# Patient Record
(demographics unavailable — no encounter records)

---

## 2025-03-14 NOTE — DISCUSSION/SUMMARY
[de-identified] : This patient presents today complaining of right hip and groin pain with mechanical symptoms of clicking popping.  I discussed the diagnosis and treatment recommendations.  At this point she does have a history of dysplasia as a child.  I recommend an MRI to rule out labral tearing.  Will see her back after the MRI for follow-up and review of the results.  In addition we placed on a course of meloxicam 15 mg each day for the next 10 to 14 days.  She should also avoid high impact activities and excessive running or walking.  I recommended a course of Mobic, 15mg per day for the next 2 weeks.  The patient was given a prescription for the Mobic with directions to take it once daily with the first meal of the day.  They were instructed to stop the medicine and call the office if there are any adverse reactions to the medicine.  At least 30 minutes was spent performing the evaluation and management on today's office visit.  This includes but is not limited to preparing to see patient including review of any test results or outside medical records, obtaining and/or reviewing separately obtained history, performing examination and evaluation, counseling and educating the patient on their diagnosis and treatment recommendations, ordering medications, tests, or procedures, documenting clinical information in the electronic health record, independently interpreting results (not separately reported) and communicating results to the patient, and coordination of care.

## 2025-03-14 NOTE — PHYSICAL EXAM
[de-identified] : The patient appears well nourished  and in no apparent distress.  The patient is alert and oriented to person, place, and time.   Affect and mood appear normal.    The head is normocephalic and atraumatic.  The eyes reveal normal sclera and extra ocular muscles are intact.   The neck appears normal with no jugular venous distention or masses noted.   Skin shows normal turgor with no evidence of eczema or psoriasis.  No respiratory distress noted.  The patient ambulates with a normal gait.  Exam of the right hip reveals full range of motion.  There is some discomfort with terminal external rotation of the hip.  There are some tenderness to palpation about the anterior hip.  There is no soft tissue swelling.  There are no masses palpable.  There is no ecchymosis.  There is no warmth erythema.  She has good strength about the lower extremity.  Pulses are intact distally.  Capillary refill is normal. There is no edema or lymphadenopathy. [de-identified] : X-ray of the pelvis and a frog lateral view of the right hip were obtained.  There may be some slight uncovering of the femoral heads bilaterally.  The joint spaces of the hips are well-maintained.  No evidence of any significant degenerative arthritis is noted.  No evidence of any fracture noted.  No subluxation or dislocation about the hips noted.  SI joints appear intact.

## 2025-03-14 NOTE — HISTORY OF PRESENT ILLNESS
[de-identified] : This patient presents today with complaints of right groin pain and snapping since 2 days ago after a trip to [place].  She denies any specific injury.  She notes pain in the groin with clicking and cracking sensations with ambulation.  Pain worse with ambulation and going up and down stairs.  Pain is improved with rest and Advil.  She has some pain that radiates into the thigh.  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

## 2025-03-28 NOTE — DISCUSSION/SUMMARY
[de-identified] : This patient presents today for follow-up regarding right hip pain.  The MRI reveals evidence of a labral tear with some impingement.  I discussed the MRI findings as well as treatment options.  At this point she did not take the meloxicam for more than 2 days.  I recommend she restart the meloxicam and try to take it for 5 to 7 days.  I also recommend evaluation Dr. Garcia regarding further treatment for her hip labral tear and impingement.  At least 20 minutes was spent performing the evaluation and management on today's office visit.  This includes but is not limited to preparing to see patient including review of any test results or outside medical records, obtaining and/or reviewing separately obtained history, performing examination and evaluation, counseling and educating the patient on their diagnosis and treatment recommendations, ordering medications, tests, or procedures, documenting clinical information in the electronic health record, independently interpreting results (not separately reported) and communicating results to the patient, and coordination of care.

## 2025-03-28 NOTE — PHYSICAL EXAM
[de-identified] : The patient appears well nourished  and in no apparent distress.  The patient is alert and oriented to person, place, and time.   Affect and mood appear normal.    The head is normocephalic and atraumatic.  The eyes reveal normal sclera and extra ocular muscles are intact.   The neck appears normal with no jugular venous distention or masses noted.   Skin shows normal turgor with no evidence of eczema or psoriasis.  No respiratory distress noted.  The patient ambulates with a normal gait.  Exam of the right hip reveals full range of motion.  There is some discomfort with terminal external rotation of the hip.  There are some tenderness to palpation about the anterior hip.  There is no soft tissue swelling.  There are no masses palpable.  There is no ecchymosis.  There is no warmth erythema.  She has good strength about the lower extremity.  Pulses are intact distally.  Capillary refill is normal. There is no edema or lymphadenopathy. [de-identified] : MRI was reviewed.  This showed evidence of a labral tear.  There is also evidence of some cam type impingement.

## 2025-04-02 NOTE — DISCUSSION/SUMMARY
[de-identified] : 35yF pw R hip JANET.  The patient was extensively counseled on treatment options including but not limited to observation, rest/activity modification, bracing, anti-inflammatory medications, physical therapy, injections, and surgery.  The natural history of the disease was thoroughly explained.  We discussed that the majority of the time, this condition can be initially treated conservatively. The patient will proceed with: -referral to Dr. Shelley for possible US guided R hip inj -PT -pt was instructed on the importance of resting, icing and elevating to minimize swelling -RTC 6w after PT   I have personally obtained the history, reviewed the ROS as noted, and performed the physical examination today.  The patient and I discussed the assessment and options and developed the plan.  All questions were answered and the patient stated their understanding of the treatment plan and appreciation of the visit.   My cumulative time spent on this patient's visit included: Preparation for the visit, review of the medical records, review of pertinent diagnostic studies, examination and counseling of the patient on the above diagnosis, treatment plan and prognosis, orders of diagnostic tests, medications and/or appropriate procedures and documentation in the medical records of today's visit.   Fede Garcia MD

## 2025-04-02 NOTE — HISTORY OF PRESENT ILLNESS
[de-identified] : 35yF referred by Dr. Adrian JEAN hip pain.   presents with increasing hip and groin pain.  Pt notes extending sitting and ascending/descending stairs worsens the hip pain and symptoms.  Does not recall an inciting traumatic event but recent flight to [place] worsened.  Pt has tried activity modification and NSAIDs with minimal relief, pain level remains a 6/10.  Feels activity level has changed and difficulty participating in sports.  Some tightness noted in hip.  Has not trialed physical therapy or injections.  Pt denies numbness, paresthesias, f/c.  (+) clicking inside hip Pt notes the pain interferes with activities of daily life and that overall mobility has been decreased.  They wish to discuss possible treatment options and return to baseline activity and mobility before symptomatic onset.  Played volleyball in college, used to run half marathons. Works in finance.

## 2025-04-02 NOTE — PHYSICAL EXAM
[de-identified] : Gen: NAD Resp: Nonlabored respirations, no SOB Gait: nl  Side: Skin in tact Tenderness:   Hip ROM                               Flexion              Extension               IR               ER Affected               120                    10                         10              30 Normal                  120                    10                         25              50   Strength                              Flexion              Extension           Abduction        Adduction  Affected               5                        5                               5                     5                     Normal                  5                        5                               5                     5  Provocative Tests: Log roll  (-) FADIR   (+) CHRISTOPHE   (-) Yovanny   (-) Resisted SLR  (-)   [de-identified] : I independently reviewed and interpreted the MRI of the R hip.  I discussed with the patient the MRI demonstrates JAENT + labral tear, cam lesion

## 2025-04-07 NOTE — PHYSICAL EXAM
[de-identified] : Constitutional: Well-nourished, well-developed, No acute distress Respiratory:  Good respiratory effort, no SOB Psychiatric: Pleasant and normal affect, alert and oriented x3 Skin: Clean dry and intact B/L LE Musculoskeletal: normal except where as noted in regional exam    Right Hip:   APPEARANCE: no marked deformities, no swelling or malalignment POSITIVE TENDERNESS: Hip flexor region, sartorius, proximal rectus femoris NONTENDER: greater trochanter, TFL, gluteal region, ischium/proximal hamstring region, and pubic symphysis. ROM: Limited in all planes due to pain. RESISTIVE TESTING: MMT 4+/5 and mild pain with hip flexion, painless resisted ER/IR/SLR/abduction/adduction. SPECIAL TESTS: + FADIR, neg CHRISTOPHE, mild pain with loaded flexion & scouring. neg fulcrum test  [de-identified] : MRI right hip at [facility] on 3/24/25 Impression: Mild CAM femoral acetabular impingement and a focal linear tear of the anterosuperior labral base. There is a small right femoral acetabular joint effusion. There is no cartilage loss. Degenerative changes at the lumbosacral junction, not fully evaluated.

## 2025-04-07 NOTE — DISCUSSION/SUMMARY
[de-identified] : Discussed findings of today's exam and possible causes of patient's pain.  Educated patient on their most probable diagnosis of chronic intermittent right hip pain with recent atraumatic exacerbation due to underlying labral tear and JANET.  Reviewed possible courses of treatment, and we collaboratively decided best course of treatment at this time will include diagnostic/therapeutic ultrasound guided steroid injection today (see procedure note).  Patient advised to make note of whether their pain is improved starting in the next few minutes until 4-6 hours while the lidocaine numbs the interior of the hip joint; this is the diagnostic part of the injection. If pain is relieved immediately that helps us determine that the etiology of the pain is coming from within the hip joint. The steroid injected into the hip today will start to have an effect in the coming days, will be most effective in the next 1-2 weeks, and may last 1-2 months; that is the therapeutic portion of this injection.  The patient should follow up with Dr. Garcia in 2 months or prn for further management.  Patient appreciates and agrees with current plan.    This note was generated using dragon medical dictation software. A reasonable effort has been made for proofreading its contents, but typos may still remain. If there are any questions or points of clarification needed please notify my office.

## 2025-04-07 NOTE — PROCEDURE
[de-identified] : Ultrasound-Guided Diagnostic/Therapeutic Injection: Right Hip Intra-articular Injection.  Indication for U/S Guidance: Ensure placement within the femoroacetabular joint for diagnostic purposes, while avoiding anterior neurovascular structures  Indication for Injection: JANET.  A discussion was had with the patient regarding this procedure and all questions were answered. All risks, benefits and alternatives were discussed. These included but were not limited to bleeding, infection, and allergic reaction.  A timeout was done to ensure correct side and patient agreed to the procedure.  A [place] griffin was created on the skin utilizing a plastic needle cap to griffin the anticipated point of entry. Alcohol was used to clean the skin, and Betadine was used to sterilize and prep the area in the anterior aspect of the hip joint. The femoroacetabular joint was visualized utilizing the SonAnulexte II Edge, the Curvilinear 30cm 5-2 MHz transducer, sterile probe cover and sterile ultrasound gel.  The joint was visualized in the longitudinal axis and an in-plane approach was used for the injection.  Ultrasound guidance was utilized to ensure accuracy of the intra-articular injection, and avoid the femoral neurovascular structures. Ethyl chloride spray was then used as a topical anesthetic.  A 22-gauge 3" needle was used to inject 2cc 0.25% bupivacaine and 1cc of 40mg/ml methylprednisolone into the femoroacetabular joint. An image confirming the correct location of the needle placement and infusion of the steroid at the end of the injection was saved.  A sterile bandage was then applied. The patient tolerated the procedure well and there were no complications.

## 2025-04-07 NOTE — PHYSICAL EXAM
[de-identified] : Constitutional: Well-nourished, well-developed, No acute distress Respiratory:  Good respiratory effort, no SOB Psychiatric: Pleasant and normal affect, alert and oriented x3 Skin: Clean dry and intact B/L LE Musculoskeletal: normal except where as noted in regional exam    Right Hip:   APPEARANCE: no marked deformities, no swelling or malalignment POSITIVE TENDERNESS: Hip flexor region, sartorius, proximal rectus femoris NONTENDER: greater trochanter, TFL, gluteal region, ischium/proximal hamstring region, and pubic symphysis. ROM: Limited in all planes due to pain. RESISTIVE TESTING: MMT 4+/5 and mild pain with hip flexion, painless resisted ER/IR/SLR/abduction/adduction. SPECIAL TESTS: + FADIR, neg CHRISTOPHE, mild pain with loaded flexion & scouring. neg fulcrum test  [de-identified] : MRI right hip at [facility] on 3/24/25 Impression: Mild CAM femoral acetabular impingement and a focal linear tear of the anterosuperior labral base. There is a small right femoral acetabular joint effusion. There is no cartilage loss. Degenerative changes at the lumbosacral junction, not fully evaluated.

## 2025-04-07 NOTE — DISCUSSION/SUMMARY
[de-identified] : Discussed findings of today's exam and possible causes of patient's pain.  Educated patient on their most probable diagnosis of chronic intermittent right hip pain with recent atraumatic exacerbation due to underlying labral tear and JANET.  Reviewed possible courses of treatment, and we collaboratively decided best course of treatment at this time will include diagnostic/therapeutic ultrasound guided steroid injection today (see procedure note).  Patient advised to make note of whether their pain is improved starting in the next few minutes until 4-6 hours while the lidocaine numbs the interior of the hip joint; this is the diagnostic part of the injection. If pain is relieved immediately that helps us determine that the etiology of the pain is coming from within the hip joint. The steroid injected into the hip today will start to have an effect in the coming days, will be most effective in the next 1-2 weeks, and may last 1-2 months; that is the therapeutic portion of this injection.  The patient should follow up with Dr. Garcia in 2 months or prn for further management.  Patient appreciates and agrees with current plan.    This note was generated using dragon medical dictation software. A reasonable effort has been made for proofreading its contents, but typos may still remain. If there are any questions or points of clarification needed please notify my office.

## 2025-04-07 NOTE — PROCEDURE
[de-identified] : Ultrasound-Guided Diagnostic/Therapeutic Injection: Right Hip Intra-articular Injection.  Indication for U/S Guidance: Ensure placement within the femoroacetabular joint for diagnostic purposes, while avoiding anterior neurovascular structures  Indication for Injection: JANET.  A discussion was had with the patient regarding this procedure and all questions were answered. All risks, benefits and alternatives were discussed. These included but were not limited to bleeding, infection, and allergic reaction.  A timeout was done to ensure correct side and patient agreed to the procedure.  A [place] griffin was created on the skin utilizing a plastic needle cap to griffin the anticipated point of entry. Alcohol was used to clean the skin, and Betadine was used to sterilize and prep the area in the anterior aspect of the hip joint. The femoroacetabular joint was visualized utilizing the SonPoshlyte II Edge, the Curvilinear 30cm 5-2 MHz transducer, sterile probe cover and sterile ultrasound gel.  The joint was visualized in the longitudinal axis and an in-plane approach was used for the injection.  Ultrasound guidance was utilized to ensure accuracy of the intra-articular injection, and avoid the femoral neurovascular structures. Ethyl chloride spray was then used as a topical anesthetic.  A 22-gauge 3" needle was used to inject 2cc 0.25% bupivacaine and 1cc of 40mg/ml methylprednisolone into the femoroacetabular joint. An image confirming the correct location of the needle placement and infusion of the steroid at the end of the injection was saved.  A sterile bandage was then applied. The patient tolerated the procedure well and there were no complications.   Improved.

## 2025-04-07 NOTE — HISTORY OF PRESENT ILLNESS
[de-identified] : Ms. JORGE PRYOR is a 35 year old woman presents today for evaluation of right hip pain referred by Dr. Garcia. She had gone to [place] about a month ago and after the long flight she noted right groin pain. She had tried to go to the gym afterwards and had an increase in pain at the right groin. She had initially seen Dr. Campbell who referred the patient for an MRI of the right hip. The MRI was performed at [facility] on 3/24/25. She has tried Meloxicam for the last 7 days which has provided relief. The pain is worse with sitting and stairs. She has not tried PT at this time.

## 2025-04-07 NOTE — HISTORY OF PRESENT ILLNESS
[de-identified] : Ms. JORGE PRYOR is a 35 year old woman presents today for evaluation of right hip pain referred by Dr. Garcia. She had gone to [place] about a month ago and after the long flight she noted right groin pain. She had tried to go to the gym afterwards and had an increase in pain at the right groin. She had initially seen Dr. Campbell who referred the patient for an MRI of the right hip. The MRI was performed at [facility] [place] on 3/24/25. She has tried Meloxicam for the last 7 days which has provided relief. The pain is worse with sitting and stairs. She has not tried PT at this time.